# Patient Record
Sex: MALE | Race: WHITE | HISPANIC OR LATINO | Employment: UNEMPLOYED | URBAN - METROPOLITAN AREA
[De-identification: names, ages, dates, MRNs, and addresses within clinical notes are randomized per-mention and may not be internally consistent; named-entity substitution may affect disease eponyms.]

---

## 2020-01-26 ENCOUNTER — OFFICE VISIT (OUTPATIENT)
Dept: URGENT CARE | Facility: PHYSICIAN GROUP | Age: 75
End: 2020-01-26

## 2020-01-26 VITALS
TEMPERATURE: 99.3 F | DIASTOLIC BLOOD PRESSURE: 90 MMHG | OXYGEN SATURATION: 94 % | RESPIRATION RATE: 16 BRPM | HEIGHT: 63 IN | HEART RATE: 97 BPM | WEIGHT: 146 LBS | SYSTOLIC BLOOD PRESSURE: 196 MMHG | BODY MASS INDEX: 25.87 KG/M2

## 2020-01-26 DIAGNOSIS — R10.9 LEFT FLANK PAIN: ICD-10-CM

## 2020-01-26 LAB
APPEARANCE UR: CLEAR
BILIRUB UR STRIP-MCNC: NEGATIVE MG/DL
COLOR UR AUTO: NORMAL
GLUCOSE UR STRIP.AUTO-MCNC: NEGATIVE MG/DL
KETONES UR STRIP.AUTO-MCNC: NORMAL MG/DL
LEUKOCYTE ESTERASE UR QL STRIP.AUTO: NEGATIVE
NITRITE UR QL STRIP.AUTO: POSITIVE
PH UR STRIP.AUTO: 5.5 [PH] (ref 5–8)
PROT UR QL STRIP: NEGATIVE MG/DL
RBC UR QL AUTO: NORMAL
SP GR UR STRIP.AUTO: 1.02
UROBILINOGEN UR STRIP-MCNC: 0.2 MG/DL

## 2020-01-26 PROCEDURE — 99204 OFFICE O/P NEW MOD 45 MIN: CPT | Performed by: PHYSICIAN ASSISTANT

## 2020-01-26 PROCEDURE — 81002 URINALYSIS NONAUTO W/O SCOPE: CPT | Performed by: PHYSICIAN ASSISTANT

## 2020-01-26 RX ORDER — ACETAMINOPHEN 500 MG
500 TABLET ORAL ONCE
Status: COMPLETED | OUTPATIENT
Start: 2020-01-26 | End: 2020-01-26

## 2020-01-26 RX ADMIN — Medication 500 MG: at 15:25

## 2020-01-26 SDOH — HEALTH STABILITY: MENTAL HEALTH: HOW OFTEN DO YOU HAVE A DRINK CONTAINING ALCOHOL?: MONTHLY OR LESS

## 2020-01-26 ASSESSMENT — ENCOUNTER SYMPTOMS
FEVER: 0
CHILLS: 0
FLANK PAIN: 1
BLOOD IN STOOL: 0
CONSTIPATION: 0
ABDOMINAL PAIN: 1

## 2020-01-26 NOTE — PROGRESS NOTES
"  Subjective:   Dean Ryan is a 74 y.o. male who presents today with   Chief Complaint   Patient presents with   • Flank Pain     L side radiates to abd x2wks      Telugu-speaking  is present.  Patient's family members present today  HPI   Patient presents today for a new problem is been occurring over the past 2 weeks.  He states that he has been having some left-sided flank pain that radiates towards his abdomen.  He denies any history of kidney stones or urinary tract infections.  Patient also states that he has not had any pain for urinating, fever, chills.  Patient states he previously did take medication for hypertension but stopped taking those medicines.  Patient states that nothing has helped his symptoms.  He denies any aggravating factors as well.  PMH:  has no past medical history on file.  MEDS: No current outpatient medications on file.    Current Facility-Administered Medications:   •  acetaminophen (TYLENOL) tablet 500 mg, 500 mg, Oral, Once, Carlitos Julian P.A.-C.  ALLERGIES: Not on File  SURGHX: No past surgical history on file.  SOCHX:  reports that he has quit smoking. He smoked 0.00 packs per day. He has never used smokeless tobacco. He reports previous alcohol use.  FH: Reviewed with patient, not pertinent to this visit.       Review of Systems   Constitutional: Negative for chills and fever.   Gastrointestinal: Positive for abdominal pain. Negative for blood in stool, constipation and melena.   Genitourinary: Positive for flank pain. Negative for dysuria, frequency, hematuria and urgency.   All other systems reviewed and are negative.       Objective:   BP (!) 196/90   Pulse 97   Temp 37.4 °C (99.3 °F) (Temporal)   Resp 16   Ht 1.6 m (5' 3\")   Wt 66.2 kg (146 lb)   SpO2 94%   BMI 25.86 kg/m²   Physical Exam  Vitals signs and nursing note reviewed.   Constitutional:       General: He is not in acute distress.     Appearance: He is well-developed.   HENT:      " Head: Normocephalic and atraumatic.      Right Ear: Hearing normal.      Left Ear: Hearing normal.   Eyes:      Pupils: Pupils are equal, round, and reactive to light.   Cardiovascular:      Rate and Rhythm: Normal rate and regular rhythm.      Heart sounds: Normal heart sounds.   Pulmonary:      Effort: Pulmonary effort is normal.   Abdominal:      General: Bowel sounds are normal. There is no distension.      Tenderness: There is tenderness in the suprapubic area, left upper quadrant and left lower quadrant. There is left CVA tenderness. There is no right CVA tenderness.       Genitourinary:     Comments: Patient deferred  exam  Musculoskeletal:      Comments: Normal movement in all 4 extremities   Skin:     General: Skin is warm and dry.   Neurological:      Mental Status: He is alert.      Coordination: Coordination normal.         UA positive for nitrates, ketones and blood.  Assessment/Plan:   Assessment    1. Left flank pain  - POCT Urinalysis  - acetaminophen (TYLENOL) tablet 500 mg  Discussed with patient that given his elevated blood pressure and clinical presentation with the current symptoms he is having he should immediately report to the emergency room for further work-up to rule out acute kidney failure.  Patient decides to had to [facility] at this time for further work-up including CT scan, blood work as well as blood pressure control.  Before he leaves patient asked for pain medication to help control his pain while he waits in the emergency room.  Patient's family member will take him to the emergency room at this time.  Differential includes pyelonephritis versus nephrolithiasis.    Please note that this dictation was created using voice recognition software. I have made every reasonable attempt to correct obvious errors, but I expect that there are errors of grammar and possibly content that I did not discover before finalizing the note.    Carlitos Julian PA-C

## 2021-01-12 DIAGNOSIS — Z23 NEED FOR VACCINATION: ICD-10-CM
